# Patient Record
Sex: FEMALE | URBAN - METROPOLITAN AREA
[De-identification: names, ages, dates, MRNs, and addresses within clinical notes are randomized per-mention and may not be internally consistent; named-entity substitution may affect disease eponyms.]

---

## 2017-01-27 ENCOUNTER — IMPORTED ENCOUNTER (OUTPATIENT)
Dept: URBAN - METROPOLITAN AREA CLINIC 1 | Facility: CLINIC | Age: 82
End: 2017-01-27

## 2017-01-27 PROBLEM — H04.123: Noted: 2017-01-27

## 2017-01-27 PROBLEM — Z79.84: Noted: 2017-01-27

## 2017-01-27 PROBLEM — E11.9: Noted: 2017-01-27

## 2017-01-27 PROBLEM — H01.004: Noted: 2017-01-27

## 2017-01-27 PROBLEM — H43.813: Noted: 2017-01-27

## 2017-01-27 PROBLEM — H16.143: Noted: 2017-01-27

## 2017-01-27 PROBLEM — Z96.1: Noted: 2017-01-27

## 2017-01-27 PROBLEM — H01.001: Noted: 2017-01-27

## 2017-01-27 PROBLEM — H35.033: Noted: 2017-01-27

## 2017-01-27 PROBLEM — E11.3291: Noted: 2017-01-27

## 2017-01-27 PROCEDURE — 92012 INTRM OPH EXAM EST PATIENT: CPT

## 2017-01-27 NOTE — PATIENT DISCUSSION
1.  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 Stressed the importance of strong glucose control. Advised of importance of at least yearly dilated examinations but to contact us immediately for any problems or concerns. 2.  DM Type II (Oral Medication). without sign of diabetic retinopathy and no blot heme on dilated retinal examination today OS No Macular Edema:  Discussed the pathophysiology of diabetes and its effect on the eye and risk of blindness. Stressed the importance of strong glucose control. Advised of importance of at least yearly dilated examinations but to contact us immediately for any problems or concerns. 3. PVD OU - stable RD Precautions. 4.  JOSE w/ PEK OU- Continue ATs TID OU routinely. 5.  Blepharitis anterior type OU - Cont Daily warm compresses and lid scrubs were recommended. 6. Pseudophakia OU - (Standard) 7. GR II Hypertensive Retinopathy OU- Stable continue HTN Control8. 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 AC quiet. Return for an appointment in 6 mo 30 with Dr. Khang Shell.

## 2017-07-28 ENCOUNTER — IMPORTED ENCOUNTER (OUTPATIENT)
Dept: URBAN - METROPOLITAN AREA CLINIC 1 | Facility: CLINIC | Age: 82
End: 2017-07-28

## 2017-07-28 PROBLEM — H04.123: Noted: 2017-07-28

## 2017-07-28 PROBLEM — H16.143: Noted: 2017-07-28

## 2017-07-28 PROBLEM — Z79.84: Noted: 2017-07-28

## 2017-07-28 PROBLEM — H35.033: Noted: 2017-07-28

## 2017-07-28 PROBLEM — H01.004: Noted: 2017-07-28

## 2017-07-28 PROBLEM — Z79.4: Noted: 2017-07-28

## 2017-07-28 PROBLEM — H43.813: Noted: 2017-07-28

## 2017-07-28 PROBLEM — H01.001: Noted: 2017-07-28

## 2017-07-28 PROBLEM — E11.9: Noted: 2017-07-28

## 2017-07-28 PROBLEM — E11.3291: Noted: 2017-07-28

## 2017-07-28 PROBLEM — Z96.1: Noted: 2017-07-28

## 2017-07-28 PROCEDURE — 92015 DETERMINE REFRACTIVE STATE: CPT

## 2017-07-28 PROCEDURE — 92014 COMPRE OPH EXAM EST PT 1/>: CPT

## 2017-07-28 NOTE — PATIENT DISCUSSION
1.  DM Type II (Taking Insulin). with Mild Nonproliferative Diabetic Retinopathy OD No Macular Edema:  Discussed the pathophysiology of diabetes and its effect on the eye and risk of blindness. Stressed the importance of strong glucose control. Advised of importance of at least yearly dilated examinations but to contact us immediately for any problems or concerns. 2.  DM Type II (Taking Insulin). without sign of diabetic retinopathy and no blot heme on dilated retinal examination today OS No Macular Edema:  Discussed the pathophysiology of diabetes and its effect on the eye and risk of blindness. Stressed the importance of strong glucose control. Advised of importance of at least yearly dilated examinations but to contact us immediately for any problems or concerns. 3. PVD OU - stable RD Precautions. 4.  JOSE w/ PEK OU- Continue ATs TID OU routinely. 5.  Blepharitis anterior type OU - Cont Daily warm compresses and lid scrubs were recommended. 6. Pseudophakia OU - (Standard) 7. GR II Hypertensive Retinopathy OU- Stable continue HTN Control8. 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 AC quiet. Letter to PCP Return for an appointment in 6 mo 10 dfe with Dr. Emma Figueroa.
negative...

## 2022-04-02 ASSESSMENT — TONOMETRY
OD_IOP_MMHG: 12
OS_IOP_MMHG: 12
OD_IOP_MMHG: 13
OS_IOP_MMHG: 13

## 2022-04-02 ASSESSMENT — VISUAL ACUITY
OS_CC: 20/100
OD_CC: 20/30
OS_SC: 20/70
OD_SC: 20/20-1